# Patient Record
Sex: MALE | URBAN - METROPOLITAN AREA
[De-identification: names, ages, dates, MRNs, and addresses within clinical notes are randomized per-mention and may not be internally consistent; named-entity substitution may affect disease eponyms.]

---

## 2019-06-30 ENCOUNTER — EMERGENCY (EMERGENCY)
Facility: HOSPITAL | Age: 49
LOS: 1 days | Discharge: HOME | End: 2019-06-30
Attending: EMERGENCY MEDICINE
Payer: MEDICAID

## 2019-06-30 VITALS
OXYGEN SATURATION: 100 % | SYSTOLIC BLOOD PRESSURE: 131 MMHG | WEIGHT: 175.05 LBS | HEIGHT: 64 IN | RESPIRATION RATE: 16 BRPM | HEART RATE: 85 BPM | TEMPERATURE: 98 F | DIASTOLIC BLOOD PRESSURE: 85 MMHG

## 2019-06-30 PROCEDURE — 99283 EMERGENCY DEPT VISIT LOW MDM: CPT

## 2019-06-30 RX ORDER — IBUPROFEN 200 MG
600 TABLET ORAL ONCE
Refills: 0 | Status: COMPLETED | OUTPATIENT
Start: 2019-06-30 | End: 2019-06-30

## 2019-06-30 RX ADMIN — Medication 600 MILLIGRAM(S): at 18:00

## 2019-06-30 RX ADMIN — Medication 600 MILLIGRAM(S): at 18:13
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
## 2019-06-30 NOTE — ED PROVIDER NOTE - NSFOLLOWUPINSTRUCTIONS_ED_ALL_ED_FT
Take Tylenol/Ibuprofen as needed for pains and/or fevers.  Drink plenty of fluids.  Follow up with your doctor or in the Clinic as needed.  Return to the ER for any concerns.

## 2019-06-30 NOTE — ED PROVIDER NOTE - NSFOLLOWUPCLINICS_GEN_ALL_ED_FT
[facility]  [facility]  [address] [facility] - [address] [place] [address]  Phone: [phone]  Fax: [phone]  Follow Up Time:

## 2019-07-01 ENCOUNTER — OUTPATIENT (OUTPATIENT)
Dept: OUTPATIENT SERVICES | Facility: HOSPITAL | Age: 49
LOS: 1 days | End: 2019-07-01
Payer: MEDICAID

## 2019-07-01 PROCEDURE — G9001: CPT

## 2019-07-09 DIAGNOSIS — Z71.89 OTHER SPECIFIED COUNSELING: ICD-10-CM

## 2019-07-09 PROBLEM — I10 ESSENTIAL (PRIMARY) HYPERTENSION: Chronic | Status: INACTIVE | Noted: 2019-06-30 | Resolved: 2019-07-05

## 2024-06-18 PROBLEM — I10 ESSENTIAL (PRIMARY) HYPERTENSION: Chronic | Status: ACTIVE | Noted: 2019-07-05

## 2024-07-08 ENCOUNTER — APPOINTMENT (OUTPATIENT)
Dept: INTERNAL MEDICINE | Facility: CLINIC | Age: 54
End: 2024-07-08

## 2024-07-08 PROBLEM — Z00.00 ENCOUNTER FOR PREVENTIVE HEALTH EXAMINATION: Status: ACTIVE | Noted: 2024-07-08

## 2024-08-07 ENCOUNTER — APPOINTMENT (OUTPATIENT)
Dept: INTERNAL MEDICINE | Facility: CLINIC | Age: 54
End: 2024-08-07

## 2024-11-12 ENCOUNTER — EMERGENCY (EMERGENCY)
Facility: HOSPITAL | Age: 54
LOS: 1 days | Discharge: HOME | End: 2024-11-12
Attending: STUDENT IN AN ORGANIZED HEALTH CARE EDUCATION/TRAINING PROGRAM | Admitting: STUDENT IN AN ORGANIZED HEALTH CARE EDUCATION/TRAINING PROGRAM
Payer: MEDICAID

## 2024-11-12 VITALS
RESPIRATION RATE: 18 BRPM | WEIGHT: 184.97 LBS | OXYGEN SATURATION: 97 % | HEART RATE: 90 BPM | SYSTOLIC BLOOD PRESSURE: 179 MMHG | TEMPERATURE: 98 F | DIASTOLIC BLOOD PRESSURE: 95 MMHG

## 2024-11-12 VITALS
DIASTOLIC BLOOD PRESSURE: 95 MMHG | HEART RATE: 85 BPM | RESPIRATION RATE: 17 BRPM | OXYGEN SATURATION: 99 % | SYSTOLIC BLOOD PRESSURE: 143 MMHG

## 2024-11-12 PROCEDURE — 93010 ELECTROCARDIOGRAM REPORT: CPT

## 2024-11-12 PROCEDURE — 99283 EMERGENCY DEPT VISIT LOW MDM: CPT

## 2024-11-12 RX ORDER — IBUPROFEN 200 MG
400 TABLET ORAL ONCE
Refills: 0 | Status: COMPLETED | OUTPATIENT
Start: 2024-11-12 | End: 2024-11-12

## 2024-11-12 RX ORDER — ACETAMINOPHEN 500 MG
975 TABLET ORAL ONCE
Refills: 0 | Status: COMPLETED | OUTPATIENT
Start: 2024-11-12 | End: 2024-11-12

## 2024-11-12 RX ADMIN — Medication 400 MILLIGRAM(S): at 12:21

## 2024-11-12 RX ADMIN — Medication 975 MILLIGRAM(S): at 12:22

## 2024-11-12 NOTE — ED ADULT NURSE NOTE - NS PRO AD NO ADVANCE DIRECTIVE
Detail Level: None Lot # (Optional): [id number] Expiration Date (Optional): 2021-03-31 Performed By: Negro Matos CMA Urine Pregnancy Test Result: negative No

## 2024-11-12 NOTE — ED PROVIDER NOTE - CLINICAL SUMMARY MEDICAL DECISION MAKING FREE TEXT BOX
DO Wisam, EM Attendin-year-old male with a past medical history of hypertension, hyperlipidemia takes amlodipine and 1 other medication for his high blood pressure however does not remember the second medication as he was recently started on it.  Differential diagnosis includes but is not limited to Asymptomatic hypertension, arthritis, osteophytes, sprain.  Given that patient has bilateral knee pain this is likely secondary to arthritis especially since is related to the change in the weather.  Will give Tylenol and Motrin.  No real indication for imaging at this time as patient is ambulatory without much difficulty has a history of this.  As far as his blood pressure he does not have any signs of endorgan damage, discussed with patient strict return precautions and management of his blood pressure.  He states he can easily get an appointment with his [facility] doctor to continue managing his blood pressure.

## 2024-11-12 NOTE — ED ADULT NURSE NOTE - NSFALLUNIVINTERV_ED_ALL_ED
Bed/Stretcher in lowest position, wheels locked, appropriate side rails in place/Call bell, personal items and telephone in reach/Instruct patient to call for assistance before getting out of bed/chair/stretcher/Non-slip footwear applied when patient is off stretcher/[place] to call system/Physically safe environment - no spills, clutter or unnecessary equipment/Purposeful proactive rounding/Room/bathroom lighting operational, light cord in reach

## 2024-11-12 NOTE — ED PROVIDER NOTE - PHYSICAL EXAMINATION
DO Wisam, EM Attending: General: well-appearing, no acute distress  Head: Atraumatic, normocephalic  Eyes: EOM grossly in tact, no scleral icterus, no discharge  ENT: moist mucous membranes  Neurology: A&Ox 3, CN grossly intact. 5/5 strength and normal sensation throughout all four extremities. No pronator drift. no facial asymmetry. Normal stance and gait.   Respiratory: CTAB, no wheezing, normal respiratory effort  CV: RRR, good s1/s2, no S3, Extremities warm and well perfused  Abdominal: Soft, non-distended  Extremities: No edema, no deformities  Skin: warm and dry. No rashes

## 2024-11-12 NOTE — ED PROVIDER NOTE - OBJECTIVE STATEMENT
DO Wisam, EM Attendin-year-old male with a past medical history of hypertension, hyperlipidemia takes amlodipine and 1 other medication for his high blood pressure however does not remember the second medication as he was recently started on it.  Has been working on controlling it with his primary doctor.  States he is has a lot of family stress because his sister is in the hospital and she is his only family member left.  Denies chest pain, visual changes, weakness, paresthesias, headache, trouble urinating, lower extremity edema or swelling, shortness of breath.    Also experiencing some atraumatic knee pain that he says is perennial and occurs every time it starts to get cold.  Denies recent falls.  No fever, chills, swelling in the joint.  Has not taken anything for this problem.

## 2024-11-12 NOTE — ED ADULT NURSE NOTE - OBJECTIVE STATEMENT
pt ambulatory  to 1a with c/o elevated BP and b/l knee pain. pt aox4, ambulatory with steady gait, Denies cp, sob, n/v/d, dizziness and visual changes. pt aox4. no swelling/deformity noted. b/l upper and lower extremities equal in strength and sensation. as per MD no blood work needed at this time. pt pending medication for dispo.

## 2024-11-12 NOTE — ED PROVIDER NOTE - PATIENT PORTAL LINK FT
You can access the FollowMyHealth Patient Portal offered by [facility] by registering at the following website: [email or web address]. By joining [employer]’s FollowMyHealth portal, you will also be able to view your health information using other applications (apps) compatible with our system.

## 2024-11-12 NOTE — ED PROVIDER NOTE - ATTENDING CONTRIBUTION TO CARE
Wisam GUZMAN: Please see the HPI, ROS, PE and MDM as authored by me. I personally made the management plan, and take responsibility for the patient's management.

## 2024-11-12 NOTE — ED PROVIDER NOTE - NSFOLLOWUPINSTRUCTIONS_ED_ALL_ED_FT
Please follow up with your primary care physician within 2-3 days.   Return to the ER for any new or concerning symptoms.   You may take 975 mg acetaminophen every 6 hours as needed for pain.    You were seen in the emergency department for elevated blood pressure.  You have known chronic high blood pressure and are taking your medications.  We performed a screening examination and you did not have any signs of organ damage at this time, so we did not do any additional workup.  Please read the instructions below regarding returning to the ER if you develop high blood pressure again.  Please follow-up with your doctor as you may need more medications to control your blood pressure at home.    When should you call for help?  	  Call 911 anytime you think you may need emergency care. This may mean having symptoms that suggest that your blood pressure is causing a serious heart or blood vessel problem. Your blood pressure may be over 180/110.    For example, call 911 if:    You have symptoms of a heart attack. These may include:  Chest pain or pressure, or a strange feeling in the chest.  Sweating.  Shortness of breath.  Nausea or vomiting.  Pain, pressure, or a strange feeling in the back, neck, jaw, or upper belly or in one or both shoulders or arms.  Light-headedness or sudden weakness.  A fast or irregular heartbeat.  You have symptoms of a stroke. These may include:  Sudden numbness, tingling, weakness, or loss of movement in your face, arm, or leg, especially on only one side of your body.  Sudden vision changes.  Sudden trouble speaking.  Sudden confusion or trouble understanding simple statements.  Sudden problems with walking or balance.  A sudden, severe headache that is different from past headaches.  You have severe back or belly pain. No

## 2024-11-12 NOTE — ED ADULT TRIAGE NOTE - CHIEF COMPLAINT QUOTE
Pt presents to ED ambulatory from home with c/o elevated blood pressure x 1 week and bilateral knee pain x 2 weeks. Pt endorses hx of HTN, HLD.